# Patient Record
(demographics unavailable — no encounter records)

---

## 2024-11-14 NOTE — HISTORY OF PRESENT ILLNESS
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 She takes Jevity 1.2 - 10 to 12 ounces five times a day for a total of 52 ounces a day.  She is tolerating feeds well.  She has gained weight since the last visit.  The button became tight on the skin and was therefore removed and replaced with a 14 Spanish feeding tube.  The size of the previous button was confirmed with the pharmacy  at 14 Spanish and 2.5 length. There is no history of vomiting or diarrhea.  She has a daily stool  There is no blood noted in her stools. She take Gyloclax 1.5 tbsp daily.    AGGRAVATING FACTORS: None  ALLEVIATING FACTORS: Glycolax helps the constipation  PREVIOUS TREATMENT: Glycolax for constipation  PERTINENT NEGATIVES: No fever or cough  INDEPENDENT HISTORIAN: Caregiver  PRESCRIPTION DRUG MANAGEMENT: The dose and frequency of Glycolax was reviewed.  Prescription for Shawn Key button 14 Spanish, 2.7 length was sent to the pharmacy

## 2024-11-14 NOTE — CONSULT LETTER
[Dear  ___] : Dear  [unfilled], [Consult Letter:] : I had the pleasure of evaluating your patient, [unfilled]. [Please see my note below.] : Please see my note below. [Consult Closing:] : Thank you very much for allowing me to participate in the care of this patient.  If you have any questions, please do not hesitate to contact me. [Sincerely,] : Sincerely, [FreeTextEntry3] : Aaliyah Pinto M.D. Director of Pediatric Gastroenterology and Nutrition [facility]

## 2024-11-14 NOTE — PHYSICAL EXAM
[Well Developed] : well developed [NAD] : in no acute distress [PERRL] : pupils were equal, round, reactive to light  [Moist & Pink Mucous Membranes] : moist and pink mucous membranes [CTAB] : lungs clear to auscultation bilaterally [Regular Rate and Rhythm] : regular rate and rhythm [Normal S1, S2] : normal S1 and S2 [Soft] : soft  [Normal Bowel Sounds] : normal bowel sounds [No HSM] : no hepatosplenomegaly appreciated [Normal Tone] : normal tone [Well-Perfused] : well-perfused [Interactive] : interactive [icteric] : anicteric [Respiratory Distress] : no respiratory distress  [Distended] : non distended [Tender] : non tender [Edema] : no edema [Cyanosis] : no cyanosis [Rash] : no rash [Jaundice] : no jaundice [de-identified] : feeding tube in place.  site was dry and clean

## 2024-11-14 NOTE — PHYSICAL EXAM
[Well Developed] : well developed [NAD] : in no acute distress [PERRL] : pupils were equal, round, reactive to light  [Moist & Pink Mucous Membranes] : moist and pink mucous membranes [CTAB] : lungs clear to auscultation bilaterally [Regular Rate and Rhythm] : regular rate and rhythm [Normal S1, S2] : normal S1 and S2 [Soft] : soft  [Normal Bowel Sounds] : normal bowel sounds [No HSM] : no hepatosplenomegaly appreciated [Normal Tone] : normal tone [Well-Perfused] : well-perfused [Interactive] : interactive [icteric] : anicteric [Respiratory Distress] : no respiratory distress  [Distended] : non distended [Tender] : non tender [Edema] : no edema [Cyanosis] : no cyanosis [Rash] : no rash [Jaundice] : no jaundice [de-identified] : feeding tube in place.  site was dry and clean

## 2024-11-14 NOTE — CONSULT LETTER
[Dear  ___] : Dear  [unfilled], [Consult Letter:] : I had the pleasure of evaluating your patient, [unfilled]. [Please see my note below.] : Please see my note below. [Consult Closing:] : Thank you very much for allowing me to participate in the care of this patient.  If you have any questions, please do not hesitate to contact me. [Sincerely,] : Sincerely, [FreeTextEntry3] : Aaliyah Pinto M.D. Director of Pediatric Gastroenterology and Nutrition [facility]

## 2024-11-14 NOTE — HISTORY OF PRESENT ILLNESS
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 She takes Jevity 1.2 - 10 to 12 ounces five times a day for a total of 52 ounces a day.  She is tolerating feeds well.  She has gained weight since the last visit.  The button became tight on the skin and was therefore removed and replaced with a 14 [place] feeding tube.  The size of the previous button was confirmed with the pharmacy  at 14 [place] and 2.5 length. There is no history of vomiting or diarrhea.  She has a daily stool  There is no blood noted in her stools. She take Gyloclax 1.5 tbsp daily.    AGGRAVATING FACTORS: None  ALLEVIATING FACTORS: Glycolax helps the constipation  PREVIOUS TREATMENT: Glycolax for constipation  PERTINENT NEGATIVES: No fever or cough  INDEPENDENT HISTORIAN: Caregiver  PRESCRIPTION DRUG MANAGEMENT: The dose and frequency of Glycolax was reviewed.  Prescription for Shawn Key button 14 Northern Irish, 2.7 length was sent to the pharmacy